# Patient Record
Sex: FEMALE | Race: WHITE | Employment: UNEMPLOYED | URBAN - METROPOLITAN AREA
[De-identification: names, ages, dates, MRNs, and addresses within clinical notes are randomized per-mention and may not be internally consistent; named-entity substitution may affect disease eponyms.]

---
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
## 2022-03-23 NOTE — ED NOTES
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 and the employee does a different thing  He states he then gets upset and ruminates about this after work as well  States that he has not been doing anything" outside of work  States that he knows he should exercise or get out and do things but he has not been doing that  He has been getting out to breakfast once a week with his mother and family which he looks forward to that  Taking his medications  States that he did not split up gabapentin to 300 mg morning and 600 mg night has been taking 300 mg 3 times a day  We discussed that this may be potentially be causing some decreased motivation the evening  States he takes 300 mg when he gets home from work  He will try the 600 at night and 300 in the morning  He has been consistent with taking the BuSpar  Physically he is feeling well and no new medications are medical issues noted  He denies suicidal ideation, intent or plan at present, has no suicidal ideation, intent or plan at present  He denies any auditory hallucinations and visual hallucinations, denies any other delusional thinking, denies any delusional thinking  He denies any side effects from medications      HPI ROS Appetite Changes and Sleep: normal appetite, normal sleep    Review Of Systems:      Constitutional Negative   ENT Negative   Cardiovascular Negative   Respiratory Negative   Gastrointestinal Negative   Genitourinary Negative   Musculoskeletal Negative   Integumentary Negative Patient resting in bed in no apparent distress. Call bell in reach, bed in low locked position. Father at bedside. Neurological Negative   Endocrine Negative   Other Symptoms Negative and None       Laboratory Results: No results found for this or any previous visit  Substance Abuse History:    Social History     Substance and Sexual Activity   Drug Use Not Currently    Types: Marijuana    Comment: previously used medical marijuana but not currently using       Family Psychiatric History:     No family history on file      The following portions of the patient's history were reviewed and updated as appropriate: past family history, past medical history, past social history, past surgical history and problem list     Social History     Socioeconomic History    Marital status: Single     Spouse name: Not on file    Number of children: Not on file    Years of education: Not on file    Highest education level: Not on file   Occupational History    Not on file   Tobacco Use    Smoking status: Former Smoker     Types: Cigarettes     Quit date:      Years since quittin 2    Smokeless tobacco: Never Used   Vaping Use    Vaping Use: Never used   Substance and Sexual Activity    Alcohol use: Not Currently     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    Drug use: Not Currently     Types: Marijuana     Comment: previously used medical marijuana but not currently using    Sexual activity: Not on file   Other Topics Concern    Not on file   Social History Narrative    Not on file     Social Determinants of Health     Financial Resource Strain: Not on file   Food Insecurity: Not on file   Transportation Needs: Not on file   Physical Activity: Not on file   Stress: Not on file   Social Connections: Not on file   Intimate Partner Violence: Not on file   Housing Stability: Not on file     Social History     Social History Narrative    Not on file        Social History     Tobacco History     Smoking Status  Former Smoker Quit date  2004 Smoking Tobacco Type  Cigarettes    Smokeless Tobacco Use  Never Used          Alcohol History Alcohol Use Status  Not Currently Comment  7 years sober          Drug Use     Drug Use Status  Not Currently Types  Marijuana Comment  previously used medical marijuana but not currently using          Sexual Activity     Sexually Active  Not Asked          Activities of Daily Living    Not Asked               Additional Substance Use Detail     Questions Responses    Problems Due to Past Use of Alcohol? Yes    Problems Due to Past Use of Substances? Yes    Substance Use Assessment Denies substance use within the past 12 months    Alcohol Use Frequency Denies use in past 12 months    Cannabis frequency Past regular use    Comment: Past rare use on 6/9/2020 Past rare use ->Past regular use on 7/24/2020     Heroin Frequency Denies use in past 12 months    Cannabis method Smoke    Comment: Smoke on 7/24/2020     1st Use of Alcohol age 16    Cannabis 1st Use medical marijuana for anxiety    Last Use of Alcohol & Amount sober 6 5 years    Longest Abstinence from Alcohol 6 5 years    Cocaine frequency Past regular use    Comment: Never used on 6/9/2020 Never used ->Past regular use on 7/24/2020     Crack Cocaine Frequency Prior dependence    Methamphetamine Frequency Denies use in past 12 months    Narcotic Frequency Denies use in past 12 months    Benzodiazepine Frequency Denies use in past 12 months    Amphetamine frequency Denies use in past 12 months    Barbituate Frequency Denies use use in past 12 months    Inhalant frequency Never used    Comment: Never used on 6/9/2020     Hallucinogen frequency Past regular use    Comment: Never used on 6/9/2020 Never used ->Past regular use on 7/24/2020     Ecstasy frequency Never used    Comment: Never used on 6/9/2020     Other drug frequency Never used    Comment: Never used on 6/9/2020     Opiate frequency Denies use in past 12 months    Not reviewed              OBJECTIVE:     Mental Status Evaluation:    Appearance age appropriate, casually dressed, good eye contact Behavior Polite, cooperative   Speech normal volume, normal pitch   Mood Dysthymic   Affect Congruent   Thought Processes logical   Associations intact associations   Thought Content normal   Perceptual Disturbances: none   Abnormal Thoughts  Risk Potential Suicidal ideation - None  Homicidal ideation - None  Potential for aggression - No   Orientation oriented to person, place, time/date and situation   Memory recent and remote memory grossly intact   Cosciousness alert and awake   Attention Span attention span and concentration are age appropriate   Intellect Not formally assessed   Insight age appropriate    Judgement good    Muscle Strength and  Gait Steady gait   Language Within normal limits   Fund of Knowledge displays adequate knowledge of current events   Pain none   Pain Scale 0       Assessment/Plan:       Diagnoses and all orders for this visit:    Major depressive disorder, recurrent, severe without psychotic features ([facility] [address] )  -     Ambulatory referral to Psychology; Future  -     lithium 600 MG capsule; Take 1 capsule (600 mg total) by mouth daily at bedtime  -     DULoxetine (CYMBALTA) 20 mg capsule; 2 po qd    Encounter for lithium monitoring  -     Ambulatory referral to Psychology; Future    Generalized anxiety disorder  -     Ambulatory referral to Psychology; Future  -     lithium 600 MG capsule; Take 1 capsule (600 mg total) by mouth daily at bedtime  -     gabapentin (NEURONTIN) 300 mg capsule; 1 po qam and 2 po qhs  -     busPIRone (BUSPAR) 15 mg tablet;  Take 1 tablet (15 mg total) by mouth 3 (three) times a day    Psychophysiological insomnia  -     traZODone (DESYREL) 50 mg tablet; 1/2 - 1 po qhs prn          Treatment Recommendations/Precautions:  Referral placed to psychology services for therapy  Also discussed physical activity and he will ride his bike once a week    Lithium 600 mg at bedtime  Last lithium level was 0 5 on November 2021  Cymbalta 40 mg daily  BuSpar 15 mg t i d  Gabapentin 300 mg the morning 600 mg at night  Trazodone 50 mg half to one at bedtime p r n  Follow up 2 months      Continue current medications    Risks/Benefits      Risks, Benefits And Possible Side Effects Of Medications:    Risks, benefits, and possible side effects of medications explained to patient and patient verbalizes understanding and agreement for treatment      Controlled Medication Discussion:     Not applicable    Psychotherapy Provided:     Individual psychotherapy provided: No

## 2022-11-15 ENCOUNTER — HOSPITAL ENCOUNTER (EMERGENCY)
Age: 22
Discharge: HOME | End: 2022-11-15
Attending: EMERGENCY MEDICINE
Payer: MEDICAID

## 2022-11-15 VITALS
BODY MASS INDEX: 21.37 KG/M2 | TEMPERATURE: 98 F | OXYGEN SATURATION: 100 % | SYSTOLIC BLOOD PRESSURE: 123 MMHG | HEIGHT: 59 IN | HEART RATE: 92 BPM | RESPIRATION RATE: 16 BRPM | DIASTOLIC BLOOD PRESSURE: 72 MMHG | WEIGHT: 106 LBS

## 2022-11-15 DIAGNOSIS — O21.9 NAUSEA AND VOMITING DURING PREGNANCY: Primary | ICD-10-CM

## 2022-11-15 LAB
ALBUMIN SERPL-MCNC: 4.6 G/DL (ref 3.5–5)
ALBUMIN/GLOB SERPL: 1.3 {RATIO} (ref 1.1–2.2)
ALP SERPL-CCNC: 45 U/L (ref 45–117)
ALT SERPL-CCNC: 46 U/L (ref 12–78)
ANION GAP SERPL CALC-SCNC: 12 MMOL/L (ref 5–15)
APPEARANCE UR: CLEAR
AST SERPL-CCNC: 33 U/L (ref 15–37)
BACTERIA URNS QL MICRO: ABNORMAL /HPF
BASOPHILS # BLD: 0 K/UL (ref 0–0.1)
BASOPHILS NFR BLD: 1 % (ref 0–1)
BILIRUB SERPL-MCNC: 0.6 MG/DL (ref 0.2–1)
BILIRUB UR QL: NEGATIVE
BUN SERPL-MCNC: 5 MG/DL (ref 6–20)
BUN/CREAT SERPL: 11 (ref 12–20)
CALCIUM SERPL-MCNC: 8.8 MG/DL (ref 8.5–10.1)
CHLORIDE SERPL-SCNC: 104 MMOL/L (ref 97–108)
CO2 SERPL-SCNC: 18 MMOL/L (ref 21–32)
COLOR UR: ABNORMAL
COMMENT, HOLDF: NORMAL
CREAT SERPL-MCNC: 0.46 MG/DL (ref 0.55–1.02)
DIFFERENTIAL METHOD BLD: NORMAL
EOSINOPHIL # BLD: 0 K/UL (ref 0–0.4)
EOSINOPHIL NFR BLD: 0 % (ref 0–7)
EPITH CASTS URNS QL MICRO: ABNORMAL /LPF
ERYTHROCYTE [DISTWIDTH] IN BLOOD BY AUTOMATED COUNT: 12.5 % (ref 11.5–14.5)
FLUAV AG NPH QL IA: NEGATIVE
FLUBV AG NOSE QL IA: NEGATIVE
GLOBULIN SER CALC-MCNC: 3.5 G/DL (ref 2–4)
GLUCOSE SERPL-MCNC: 77 MG/DL (ref 65–100)
GLUCOSE UR STRIP.AUTO-MCNC: NEGATIVE MG/DL
HCT VFR BLD AUTO: 44.8 % (ref 35–47)
HGB BLD-MCNC: 15.7 G/DL (ref 11.5–16)
HGB UR QL STRIP: NEGATIVE
HYALINE CASTS URNS QL MICRO: ABNORMAL /LPF (ref 0–2)
IMM GRANULOCYTES # BLD AUTO: 0 K/UL (ref 0–0.04)
IMM GRANULOCYTES NFR BLD AUTO: 0 % (ref 0–0.5)
KETONES UR QL STRIP.AUTO: >80 MG/DL
LEUKOCYTE ESTERASE UR QL STRIP.AUTO: NEGATIVE
LYMPHOCYTES # BLD: 2 K/UL (ref 0.8–3.5)
LYMPHOCYTES NFR BLD: 39 % (ref 12–49)
MAGNESIUM SERPL-MCNC: 2.1 MG/DL (ref 1.6–2.4)
MCH RBC QN AUTO: 31.1 PG (ref 26–34)
MCHC RBC AUTO-ENTMCNC: 35 G/DL (ref 30–36.5)
MCV RBC AUTO: 88.7 FL (ref 80–99)
MONOCYTES # BLD: 0.5 K/UL (ref 0–1)
MONOCYTES NFR BLD: 10 % (ref 5–13)
NEUTS SEG # BLD: 2.6 K/UL (ref 1.8–8)
NEUTS SEG NFR BLD: 50 % (ref 32–75)
NITRITE UR QL STRIP.AUTO: NEGATIVE
NRBC # BLD: 0 K/UL (ref 0–0.01)
NRBC BLD-RTO: 0 PER 100 WBC
PH UR STRIP: 5.5 [PH] (ref 5–8)
PLATELET # BLD AUTO: 227 K/UL (ref 150–400)
PMV BLD AUTO: 9.2 FL (ref 8.9–12.9)
POTASSIUM SERPL-SCNC: 3.5 MMOL/L (ref 3.5–5.1)
PROT SERPL-MCNC: 8.1 G/DL (ref 6.4–8.2)
PROT UR STRIP-MCNC: 30 MG/DL
RBC # BLD AUTO: 5.05 M/UL (ref 3.8–5.2)
RBC #/AREA URNS HPF: ABNORMAL /HPF (ref 0–5)
SAMPLES BEING HELD,HOLD: NORMAL
SODIUM SERPL-SCNC: 134 MMOL/L (ref 136–145)
SP GR UR REFRACTOMETRY: 1.03 (ref 1–1.03)
UROBILINOGEN UR QL STRIP.AUTO: 1 EU/DL (ref 0.2–1)
WBC # BLD AUTO: 5.2 K/UL (ref 3.6–11)
WBC URNS QL MICRO: ABNORMAL /HPF (ref 0–4)

## 2022-11-15 PROCEDURE — 36415 COLL VENOUS BLD VENIPUNCTURE: CPT

## 2022-11-15 PROCEDURE — 85025 COMPLETE CBC W/AUTO DIFF WBC: CPT

## 2022-11-15 PROCEDURE — 99284 EMERGENCY DEPT VISIT MOD MDM: CPT

## 2022-11-15 PROCEDURE — U0005 INFEC AGEN DETEC AMPLI PROBE: HCPCS

## 2022-11-15 PROCEDURE — 80053 COMPREHEN METABOLIC PANEL: CPT

## 2022-11-15 PROCEDURE — 74011250636 HC RX REV CODE- 250/636: Performed by: EMERGENCY MEDICINE

## 2022-11-15 PROCEDURE — 83735 ASSAY OF MAGNESIUM: CPT

## 2022-11-15 PROCEDURE — 87804 INFLUENZA ASSAY W/OPTIC: CPT

## 2022-11-15 PROCEDURE — 96374 THER/PROPH/DIAG INJ IV PUSH: CPT

## 2022-11-15 PROCEDURE — 96361 HYDRATE IV INFUSION ADD-ON: CPT

## 2022-11-15 PROCEDURE — 81001 URINALYSIS AUTO W/SCOPE: CPT

## 2022-11-15 RX ORDER — ONDANSETRON 2 MG/ML
4 INJECTION INTRAMUSCULAR; INTRAVENOUS
Status: COMPLETED | OUTPATIENT
Start: 2022-11-15 | End: 2022-11-15

## 2022-11-15 RX ADMIN — ONDANSETRON 4 MG: 2 INJECTION INTRAMUSCULAR; INTRAVENOUS at 16:03

## 2022-11-15 RX ADMIN — SODIUM CHLORIDE 1000 ML: 9 INJECTION, SOLUTION INTRAVENOUS at 15:58

## 2022-11-15 NOTE — ED PROVIDER NOTES
Benita Rivera is a 24 yo F with is 7 weeks pregnant  and has had nausea and vomiting for the past 3 days. She also had a fever of 100.9 yesterday. She denies cough or congestion, diarrhea, vaginal discharge or bleeding or pain. She has not yet made an appointment with an OB. She has been taking zofran and Unasom and B6 but it has not helped much. No past medical history on file. No past surgical history on file. Family History:   Problem Relation Age of Onset    No Known Problems Mother     No Known Problems Father        Social History     Socioeconomic History    Marital status: SINGLE     Spouse name: Not on file    Number of children: Not on file    Years of education: Not on file    Highest education level: Not on file   Occupational History    Not on file   Tobacco Use    Smoking status: Never    Smokeless tobacco: Not on file   Substance and Sexual Activity    Alcohol use: No    Drug use: No    Sexual activity: Never   Other Topics Concern    Not on file   Social History Narrative    Not on file     Social Determinants of Health     Financial Resource Strain: Not on file   Food Insecurity: Not on file   Transportation Needs: Not on file   Physical Activity: Not on file   Stress: Not on file   Social Connections: Not on file   Intimate Partner Violence: Not on file   Housing Stability: Not on file         ALLERGIES: Motrin [ibuprofen]    Review of Systems   Constitutional:  Positive for fever. HENT:  Negative for sore throat. Eyes:  Negative for visual disturbance. Respiratory:  Negative for cough. Cardiovascular:  Negative for chest pain. Gastrointestinal:  Positive for nausea and vomiting. Negative for abdominal pain. Genitourinary:  Negative for dysuria, vaginal bleeding, vaginal discharge and vaginal pain. Musculoskeletal:  Negative for back pain. Skin:  Negative for rash. Neurological:  Negative for headaches.      Vitals:    11/15/22 1548   BP: (!) 144/79 Pulse: (!) 116   Resp: 16   Temp: 97.9 °F (36.6 °C)   SpO2: 99%   Weight: 48.1 kg (106 lb)   Height: 4' 11\" (1.499 m)            Physical Exam  Vitals and nursing note reviewed. Constitutional:       General: She is not in acute distress. Appearance: She is well-developed. HENT:      Head: Normocephalic and atraumatic. Mouth/Throat:      Mouth: Mucous membranes are moist.   Eyes:      Extraocular Movements: Extraocular movements intact. Conjunctiva/sclera: Conjunctivae normal.   Neck:      Trachea: Phonation normal.   Cardiovascular:      Rate and Rhythm: Normal rate. Pulmonary:      Effort: Pulmonary effort is normal. No respiratory distress. Abdominal:      General: There is no distension. Musculoskeletal:         General: No tenderness. Normal range of motion. Cervical back: Normal range of motion. Skin:     General: Skin is warm and dry. Neurological:      General: No focal deficit present. Mental Status: She is alert. She is not disoriented. Motor: No abnormal muscle tone. MDM         6:41 PM  Patient tolerating PO gingerale and crackers and HR improved after zofran and IVNS bolus. Will discharge home.       Procedures

## 2022-11-15 NOTE — ED TRIAGE NOTES
7 weeks pregnant with continuous nausea and vomiting. . Seen at [facility] ED 2 days ago for same, given zofran PO after dc with no relief.      Denies vaginal bleeding or abdominal cramping

## 2022-11-16 LAB
SARS-COV-2, XPLCVT: DETECTED
SOURCE, COVRS: ABNORMAL

## 2022-11-17 ENCOUNTER — HOSPITAL ENCOUNTER (EMERGENCY)
Age: 22
Discharge: HOME | End: 2022-11-17
Attending: EMERGENCY MEDICINE
Payer: MEDICAID

## 2022-11-17 VITALS
DIASTOLIC BLOOD PRESSURE: 73 MMHG | WEIGHT: 101.41 LBS | OXYGEN SATURATION: 97 % | TEMPERATURE: 98.8 F | RESPIRATION RATE: 18 BRPM | HEIGHT: 59 IN | SYSTOLIC BLOOD PRESSURE: 124 MMHG | BODY MASS INDEX: 20.44 KG/M2 | HEART RATE: 97 BPM

## 2022-11-17 DIAGNOSIS — O21.0 HYPEREMESIS GRAVIDARUM: Primary | ICD-10-CM

## 2022-11-17 LAB
ALBUMIN SERPL-MCNC: 5.5 G/DL (ref 3.5–5)
ALBUMIN/GLOB SERPL: 1.4 {RATIO} (ref 1.1–2.2)
ALP SERPL-CCNC: 53 U/L (ref 45–117)
ALT SERPL-CCNC: 44 U/L (ref 12–78)
ANION GAP SERPL CALC-SCNC: 20 MMOL/L (ref 5–15)
APPEARANCE UR: CLEAR
AST SERPL-CCNC: 32 U/L (ref 15–37)
BACTERIA URNS QL MICRO: ABNORMAL /HPF
BASOPHILS # BLD: 0 K/UL (ref 0–0.1)
BASOPHILS NFR BLD: 0 % (ref 0–1)
BILIRUB SERPL-MCNC: 1.2 MG/DL (ref 0.2–1)
BILIRUB UR QL: NEGATIVE
BUN SERPL-MCNC: 4 MG/DL (ref 6–20)
BUN/CREAT SERPL: 6 (ref 12–20)
CALCIUM SERPL-MCNC: 9 MG/DL (ref 8.5–10.1)
CHLORIDE SERPL-SCNC: 97 MMOL/L (ref 97–108)
CO2 SERPL-SCNC: 17 MMOL/L (ref 21–32)
COLOR UR: ABNORMAL
CREAT SERPL-MCNC: 0.63 MG/DL (ref 0.55–1.02)
DIFFERENTIAL METHOD BLD: ABNORMAL
EOSINOPHIL # BLD: 0 K/UL (ref 0–0.4)
EOSINOPHIL NFR BLD: 0 % (ref 0–7)
EPITH CASTS URNS QL MICRO: ABNORMAL /LPF
ERYTHROCYTE [DISTWIDTH] IN BLOOD BY AUTOMATED COUNT: 12.1 % (ref 11.5–14.5)
GLOBULIN SER CALC-MCNC: 3.8 G/DL (ref 2–4)
GLUCOSE SERPL-MCNC: 75 MG/DL (ref 65–100)
GLUCOSE UR STRIP.AUTO-MCNC: NEGATIVE MG/DL
HCG SERPL-ACNC: ABNORMAL MIU/ML (ref 0–6)
HCT VFR BLD AUTO: 47.3 % (ref 35–47)
HGB BLD-MCNC: 16.7 G/DL (ref 11.5–16)
HGB UR QL STRIP: ABNORMAL
IMM GRANULOCYTES # BLD AUTO: 0 K/UL (ref 0–0.04)
IMM GRANULOCYTES NFR BLD AUTO: 0 % (ref 0–0.5)
KETONES UR QL STRIP.AUTO: >80 MG/DL
LEUKOCYTE ESTERASE UR QL STRIP.AUTO: NEGATIVE
LIPASE SERPL-CCNC: 86 U/L (ref 73–393)
LYMPHOCYTES # BLD: 2.3 K/UL (ref 0.8–3.5)
LYMPHOCYTES NFR BLD: 27 % (ref 12–49)
MCH RBC QN AUTO: 30.8 PG (ref 26–34)
MCHC RBC AUTO-ENTMCNC: 35.3 G/DL (ref 30–36.5)
MCV RBC AUTO: 87.3 FL (ref 80–99)
MONOCYTES # BLD: 0.6 K/UL (ref 0–1)
MONOCYTES NFR BLD: 7 % (ref 5–13)
NEUTS SEG # BLD: 5.5 K/UL (ref 1.8–8)
NEUTS SEG NFR BLD: 65 % (ref 32–75)
NITRITE UR QL STRIP.AUTO: NEGATIVE
NRBC # BLD: 0 K/UL (ref 0–0.01)
NRBC BLD-RTO: 0 PER 100 WBC
PH UR STRIP: 6 [PH] (ref 5–8)
PLATELET # BLD AUTO: 242 K/UL (ref 150–400)
PMV BLD AUTO: 9.5 FL (ref 8.9–12.9)
POTASSIUM SERPL-SCNC: 3.4 MMOL/L (ref 3.5–5.1)
PROT SERPL-MCNC: 9.3 G/DL (ref 6.4–8.2)
PROT UR STRIP-MCNC: ABNORMAL MG/DL
RBC # BLD AUTO: 5.42 M/UL (ref 3.8–5.2)
RBC #/AREA URNS HPF: ABNORMAL /HPF (ref 0–5)
SODIUM SERPL-SCNC: 134 MMOL/L (ref 136–145)
SP GR UR REFRACTOMETRY: 1.02 (ref 1–1.03)
UROBILINOGEN UR QL STRIP.AUTO: 0.2 EU/DL (ref 0.2–1)
WBC # BLD AUTO: 8.4 K/UL (ref 3.6–11)
WBC URNS QL MICRO: ABNORMAL /HPF (ref 0–4)

## 2022-11-17 PROCEDURE — 83690 ASSAY OF LIPASE: CPT

## 2022-11-17 PROCEDURE — 81001 URINALYSIS AUTO W/SCOPE: CPT

## 2022-11-17 PROCEDURE — 36415 COLL VENOUS BLD VENIPUNCTURE: CPT

## 2022-11-17 PROCEDURE — 74011250636 HC RX REV CODE- 250/636: Performed by: EMERGENCY MEDICINE

## 2022-11-17 PROCEDURE — 85025 COMPLETE CBC W/AUTO DIFF WBC: CPT

## 2022-11-17 PROCEDURE — 80053 COMPREHEN METABOLIC PANEL: CPT

## 2022-11-17 PROCEDURE — 84702 CHORIONIC GONADOTROPIN TEST: CPT

## 2022-11-17 RX ORDER — DIMENHYDRINATE 50 MG
50 TABLET ORAL
Status: DISCONTINUED | OUTPATIENT
Start: 2022-11-17 | End: 2022-11-17

## 2022-11-17 RX ORDER — PYRIDOXINE HYDROCHLORIDE 100 MG/ML
10 INJECTION, SOLUTION INTRAMUSCULAR; INTRAVENOUS
Status: DISCONTINUED | OUTPATIENT
Start: 2022-11-17 | End: 2022-11-17

## 2022-11-17 RX ORDER — METOCLOPRAMIDE HYDROCHLORIDE 5 MG/ML
10 INJECTION INTRAMUSCULAR; INTRAVENOUS EVERY 6 HOURS
Status: DISCONTINUED | OUTPATIENT
Start: 2022-11-17 | End: 2022-11-18 | Stop reason: HOSPADM

## 2022-11-17 RX ORDER — DIPHENHYDRAMINE HYDROCHLORIDE 50 MG/ML
25 INJECTION, SOLUTION INTRAMUSCULAR; INTRAVENOUS
Status: COMPLETED | OUTPATIENT
Start: 2022-11-17 | End: 2022-11-17

## 2022-11-17 RX ORDER — ONDANSETRON 2 MG/ML
4 INJECTION INTRAMUSCULAR; INTRAVENOUS ONCE
Status: DISCONTINUED | OUTPATIENT
Start: 2022-11-17 | End: 2022-11-17

## 2022-11-17 RX ORDER — METOCLOPRAMIDE 5 MG/1
5 TABLET ORAL
Qty: 20 TABLET | Refills: 0 | Status: SHIPPED | OUTPATIENT
Start: 2022-11-17 | End: 2022-11-27

## 2022-11-17 RX ADMIN — SODIUM CHLORIDE 1000 ML: 9 INJECTION, SOLUTION INTRAVENOUS at 20:13

## 2022-11-17 RX ADMIN — METOCLOPRAMIDE 10 MG: 5 INJECTION, SOLUTION INTRAMUSCULAR; INTRAVENOUS at 20:12

## 2022-11-17 RX ADMIN — DIPHENHYDRAMINE HYDROCHLORIDE 25 MG: 50 INJECTION, SOLUTION INTRAMUSCULAR; INTRAVENOUS at 20:18

## 2022-11-17 NOTE — PROGRESS NOTES
Pt correct number [phone]  I attempted to reach the patient and/or the patient's parent to discuss available lab result.  Message was left for call back concerning lab result

## 2022-11-18 NOTE — ED NOTES
Pt discharged home with grandfather, verbalized understanding of discharge instructions and prescription that was e-scribed, pt ambulated out of dept with steady gait.

## 2022-11-18 NOTE — DISCHARGE INSTRUCTIONS
You were seen in the emergency department for vomiting. The results of your tests including an EKG, imaging, and lab work, were all consistent with dehydration and decreased food intake. Although an exact cause of your symptoms was not identified, the most likely cause is hyperemesis gravidarum. Please take any medications prescribed at this visit as instructed. Please also follow-up with your PCP or return to the emergency department if you experience a worsening of symptoms or any new symptoms that are concerning to you.

## 2022-11-18 NOTE — ED PROVIDER NOTES
25year-old G2, P0, 8 weeks pregnant by dates presents to the emergency department on her third visit in 1 week complaining of nausea, vomiting, diarrhea and mild intermittent diffuse abdominal pain that she relates to diarrhea. She reports that she cannot keep water down if she does not try to eat solids along with it, but otherwise has vomited up any food that she is eaten for the last few days. She notes that she has been taking Zofran at home with no relief of symptoms. She also reports that they have given her Zofran in the emergency department, with no relief. She notes that the earliest she was able to get into see her OB/GYN was November 28. She has no additional complaints at this time    The history is provided by the patient. Pregnancy Problem   Associated symptoms include diarrhea, nausea and vomiting. History reviewed. No pertinent past medical history. History reviewed. No pertinent surgical history. Family History:   Problem Relation Age of Onset    No Known Problems Mother     No Known Problems Father        Social History     Socioeconomic History    Marital status: SINGLE     Spouse name: Not on file    Number of children: Not on file    Years of education: Not on file    Highest education level: Not on file   Occupational History    Not on file   Tobacco Use    Smoking status: Never    Smokeless tobacco: Not on file   Substance and Sexual Activity    Alcohol use: No    Drug use: No    Sexual activity: Never   Other Topics Concern    Not on file   Social History Narrative    Not on file     Social Determinants of Health     Financial Resource Strain: Not on file   Food Insecurity: Not on file   Transportation Needs: Not on file   Physical Activity: Not on file   Stress: Not on file   Social Connections: Not on file   Intimate Partner Violence: Not on file   Housing Stability: Not on file         ALLERGIES: Motrin [ibuprofen]    Review of Systems   Constitutional: Negative. HENT: Negative. Eyes: Negative. Respiratory: Negative. Cardiovascular: Negative. Gastrointestinal:  Positive for diarrhea, nausea and vomiting. Endocrine: Negative. Genitourinary: Negative. Musculoskeletal: Negative. Skin: Negative. Neurological: Negative. Hematological: Negative. Psychiatric/Behavioral: Negative. Vitals:    11/17/22 1934   BP: (!) 146/81   Pulse: 97   Resp: 18   Temp: 98.8 °F (37.1 °C)   SpO2: 97%   Weight: 46 kg (101 lb 6.6 oz)   Height: 4' 11\" (1.499 m)            Physical Exam  Vitals and nursing note reviewed. Constitutional:       General: She is not in acute distress. Appearance: Normal appearance. She is not ill-appearing. HENT:      Head: Normocephalic and atraumatic. Nose: Nose normal.      Mouth/Throat:      Mouth: Mucous membranes are moist.   Eyes:      Extraocular Movements: Extraocular movements intact. Pupils: Pupils are equal, round, and reactive to light. Cardiovascular:      Rate and Rhythm: Regular rhythm. Tachycardia present. Pulses: Normal pulses. Pulmonary:      Effort: Pulmonary effort is normal. No respiratory distress. Breath sounds: Normal breath sounds. Abdominal:      General: There is no distension. Palpations: Abdomen is soft. Tenderness: There is no abdominal tenderness. Musculoskeletal:         General: Normal range of motion. Cervical back: Normal range of motion and neck supple. Skin:     General: Skin is warm and dry. Neurological:      General: No focal deficit present. Mental Status: She is alert and oriented to person, place, and time.    Psychiatric:         Mood and Affect: Mood normal.        MDM  Number of Diagnoses or Management Options  Hyperemesis gravidarum  Diagnosis management comments: DDx: Hyperemesis gravidarum, viral gastroenteritis, COVID    Plan:  - Labs: CBC, CMP, UA, beta HCG  - Medications: Metoclopramide, diphenhydramine  - Consults: OB/GYN for expedited follow-up    Reassessment: Patient has some labs that are concerning for dehydration and starvation, including ketones in her urine. Additionally she has lost 2 KGs in the last 2 days. Reassuringly though, with the above interventions, she has been able to tolerate soft food without vomiting or feeling nauseated. Spoke to Dr. Gomez Cooney, the covering physician for patient's OB/GYN Dr. Fiorella Romo, and she agrees with plan to discharge patient with prescription for the above and states that if patient calls their office tomorrow morning, she will be able to be seen. Discussed this with the patient and she agrees with this plan. We will discharge her at this time with strict return precautions for worsening vomiting or abdominal pain.        Amount and/or Complexity of Data Reviewed  Clinical lab tests: reviewed    Risk of Complications, Morbidity, and/or Mortality  Presenting problems: low  Diagnostic procedures: low  Management options: low    Patient Progress  Patient progress: improved         Procedures

## 2022-11-18 NOTE — ED TRIAGE NOTES
Pt is eight weeks pregnant. Pt has received fluids 3 times this week for hyperemesis. Pt was not able to get an appt with OB Nov 28.

## 2022-11-19 ENCOUNTER — HOSPITAL ENCOUNTER (OUTPATIENT)
Age: 22
Setting detail: OBSERVATION
Discharge: LEFT BEFORE DISCHARGE | End: 2022-11-19
Attending: STUDENT IN AN ORGANIZED HEALTH CARE EDUCATION/TRAINING PROGRAM | Admitting: HOSPITALIST
Payer: MEDICAID

## 2022-11-19 ENCOUNTER — APPOINTMENT (OUTPATIENT)
Dept: GENERAL RADIOLOGY | Age: 22
End: 2022-11-19
Attending: STUDENT IN AN ORGANIZED HEALTH CARE EDUCATION/TRAINING PROGRAM
Payer: MEDICAID

## 2022-11-19 VITALS
BODY MASS INDEX: 19.24 KG/M2 | HEART RATE: 105 BPM | WEIGHT: 95.46 LBS | DIASTOLIC BLOOD PRESSURE: 66 MMHG | HEIGHT: 59 IN | SYSTOLIC BLOOD PRESSURE: 115 MMHG | OXYGEN SATURATION: 100 % | RESPIRATION RATE: 16 BRPM | TEMPERATURE: 98 F

## 2022-11-19 DIAGNOSIS — O21.0 HYPEREMESIS GRAVIDARUM: Primary | ICD-10-CM

## 2022-11-19 DIAGNOSIS — A41.9 SEPSIS WITHOUT ACUTE ORGAN DYSFUNCTION, DUE TO UNSPECIFIED ORGANISM (HCC): ICD-10-CM

## 2022-11-19 PROBLEM — R11.2 NAUSEA AND VOMITING: Status: ACTIVE | Noted: 2022-11-19

## 2022-11-19 PROBLEM — R65.10 SIRS (SYSTEMIC INFLAMMATORY RESPONSE SYNDROME) (HCC): Status: ACTIVE | Noted: 2022-11-19

## 2022-11-19 PROBLEM — E87.20 METABOLIC ACIDOSIS, NORMAL ANION GAP (NAG): Status: ACTIVE | Noted: 2022-11-19

## 2022-11-19 PROBLEM — R11.10 VOMITING: Status: ACTIVE | Noted: 2022-11-19

## 2022-11-19 PROBLEM — O21.1 HYPEREMESIS GRAVIDARUM WITH METABOLIC DISTURBANCE: Status: ACTIVE | Noted: 2022-11-19

## 2022-11-19 PROBLEM — Z3A.08 8 WEEKS GESTATION OF PREGNANCY: Status: ACTIVE | Noted: 2022-11-19

## 2022-11-19 PROBLEM — U07.1 COVID-19 VIRUS INFECTION: Status: ACTIVE | Noted: 2022-11-19

## 2022-11-19 LAB
ALBUMIN SERPL-MCNC: 5.5 G/DL (ref 3.5–5)
ALBUMIN/GLOB SERPL: 1.4 {RATIO} (ref 1.1–2.2)
ALP SERPL-CCNC: 52 U/L (ref 45–117)
ALT SERPL-CCNC: 38 U/L (ref 12–78)
AMORPH CRY URNS QL MICRO: ABNORMAL
AMPHET UR QL SCN: NEGATIVE
ANION GAP SERPL CALC-SCNC: 12 MMOL/L (ref 5–15)
ANION GAP SERPL CALC-SCNC: 22 MMOL/L (ref 5–15)
APPEARANCE UR: ABNORMAL
AST SERPL-CCNC: 22 U/L (ref 15–37)
BACTERIA URNS QL MICRO: NEGATIVE /HPF
BARBITURATES UR QL SCN: NEGATIVE
BASOPHILS # BLD: 0 K/UL (ref 0–0.1)
BASOPHILS NFR BLD: 0 % (ref 0–1)
BENZODIAZ UR QL: NEGATIVE
BILIRUB SERPL-MCNC: 1.1 MG/DL (ref 0.2–1)
BILIRUB UR QL: NEGATIVE
BUN SERPL-MCNC: 3 MG/DL (ref 6–20)
BUN SERPL-MCNC: 6 MG/DL (ref 6–20)
BUN/CREAT SERPL: 7 (ref 12–20)
BUN/CREAT SERPL: 9 (ref 12–20)
CALCIUM SERPL-MCNC: 7.9 MG/DL (ref 8.5–10.1)
CALCIUM SERPL-MCNC: 9.6 MG/DL (ref 8.5–10.1)
CANNABINOIDS UR QL SCN: POSITIVE
CHLORIDE SERPL-SCNC: 100 MMOL/L (ref 97–108)
CHLORIDE SERPL-SCNC: 116 MMOL/L (ref 97–108)
CO2 SERPL-SCNC: 13 MMOL/L (ref 21–32)
CO2 SERPL-SCNC: 15 MMOL/L (ref 21–32)
COCAINE UR QL SCN: NEGATIVE
COLOR UR: ABNORMAL
COMMENT, HOLDF: NORMAL
CREAT SERPL-MCNC: 0.44 MG/DL (ref 0.55–1.02)
CREAT SERPL-MCNC: 0.7 MG/DL (ref 0.55–1.02)
DIFFERENTIAL METHOD BLD: ABNORMAL
DRUG SCRN COMMENT,DRGCM: ABNORMAL
EOSINOPHIL # BLD: 0 K/UL (ref 0–0.4)
EOSINOPHIL NFR BLD: 0 % (ref 0–7)
EPITH CASTS URNS QL MICRO: ABNORMAL /LPF
ERYTHROCYTE [DISTWIDTH] IN BLOOD BY AUTOMATED COUNT: 12 % (ref 11.5–14.5)
GLOBULIN SER CALC-MCNC: 3.8 G/DL (ref 2–4)
GLUCOSE SERPL-MCNC: 127 MG/DL (ref 65–100)
GLUCOSE SERPL-MCNC: 92 MG/DL (ref 65–100)
GLUCOSE UR STRIP.AUTO-MCNC: NEGATIVE MG/DL
HCT VFR BLD AUTO: 43.6 % (ref 35–47)
HGB BLD-MCNC: 16.2 G/DL (ref 11.5–16)
HGB UR QL STRIP: ABNORMAL
IMM GRANULOCYTES # BLD AUTO: 0.1 K/UL (ref 0–0.04)
IMM GRANULOCYTES NFR BLD AUTO: 0 % (ref 0–0.5)
KETONES UR QL STRIP.AUTO: >80 MG/DL
LACTATE SERPL-SCNC: 1.4 MMOL/L (ref 0.4–2)
LACTATE SERPL-SCNC: 2.1 MMOL/L (ref 0.4–2)
LEUKOCYTE ESTERASE UR QL STRIP.AUTO: NEGATIVE
LIPASE SERPL-CCNC: 101 U/L (ref 73–393)
LYMPHOCYTES # BLD: 1.1 K/UL (ref 0.8–3.5)
LYMPHOCYTES NFR BLD: 7 % (ref 12–49)
MCH RBC QN AUTO: 31.9 PG (ref 26–34)
MCHC RBC AUTO-ENTMCNC: 37.2 G/DL (ref 30–36.5)
MCV RBC AUTO: 85.8 FL (ref 80–99)
METHADONE UR QL: NEGATIVE
MONOCYTES # BLD: 0.6 K/UL (ref 0–1)
MONOCYTES NFR BLD: 4 % (ref 5–13)
NEUTS SEG # BLD: 13.8 K/UL (ref 1.8–8)
NEUTS SEG NFR BLD: 89 % (ref 32–75)
NITRITE UR QL STRIP.AUTO: NEGATIVE
NRBC # BLD: 0 K/UL (ref 0–0.01)
NRBC BLD-RTO: 0 PER 100 WBC
OPIATES UR QL: POSITIVE
PCP UR QL: NEGATIVE
PH UR STRIP: 6 [PH] (ref 5–8)
PLATELET # BLD AUTO: 276 K/UL (ref 150–400)
PMV BLD AUTO: 10.1 FL (ref 8.9–12.9)
POTASSIUM SERPL-SCNC: 3.3 MMOL/L (ref 3.5–5.1)
POTASSIUM SERPL-SCNC: 3.7 MMOL/L (ref 3.5–5.1)
PROT SERPL-MCNC: 9.3 G/DL (ref 6.4–8.2)
PROT UR STRIP-MCNC: 100 MG/DL
RBC # BLD AUTO: 5.08 M/UL (ref 3.8–5.2)
RBC #/AREA URNS HPF: ABNORMAL /HPF (ref 0–5)
SAMPLES BEING HELD,HOLD: NORMAL
SODIUM SERPL-SCNC: 137 MMOL/L (ref 136–145)
SODIUM SERPL-SCNC: 141 MMOL/L (ref 136–145)
SP GR UR REFRACTOMETRY: 1.02 (ref 1–1.03)
UR CULT HOLD, URHOLD: NORMAL
UROBILINOGEN UR QL STRIP.AUTO: 0.2 EU/DL (ref 0.2–1)
WBC # BLD AUTO: 15.5 K/UL (ref 3.6–11)
WBC URNS QL MICRO: ABNORMAL /HPF (ref 0–4)

## 2022-11-19 PROCEDURE — 36415 COLL VENOUS BLD VENIPUNCTURE: CPT

## 2022-11-19 PROCEDURE — 81001 URINALYSIS AUTO W/SCOPE: CPT

## 2022-11-19 PROCEDURE — 85025 COMPLETE CBC W/AUTO DIFF WBC: CPT

## 2022-11-19 PROCEDURE — 83690 ASSAY OF LIPASE: CPT

## 2022-11-19 PROCEDURE — 74011250636 HC RX REV CODE- 250/636: Performed by: HOSPITALIST

## 2022-11-19 PROCEDURE — 96366 THER/PROPH/DIAG IV INF ADDON: CPT

## 2022-11-19 PROCEDURE — 96365 THER/PROPH/DIAG IV INF INIT: CPT

## 2022-11-19 PROCEDURE — 80053 COMPREHEN METABOLIC PANEL: CPT

## 2022-11-19 PROCEDURE — 71045 X-RAY EXAM CHEST 1 VIEW: CPT

## 2022-11-19 PROCEDURE — 96376 TX/PRO/DX INJ SAME DRUG ADON: CPT

## 2022-11-19 PROCEDURE — G0378 HOSPITAL OBSERVATION PER HR: HCPCS

## 2022-11-19 PROCEDURE — 96361 HYDRATE IV INFUSION ADD-ON: CPT

## 2022-11-19 PROCEDURE — 65270000046 HC RM TELEMETRY

## 2022-11-19 PROCEDURE — 96374 THER/PROPH/DIAG INJ IV PUSH: CPT

## 2022-11-19 PROCEDURE — 74011250636 HC RX REV CODE- 250/636: Performed by: STUDENT IN AN ORGANIZED HEALTH CARE EDUCATION/TRAINING PROGRAM

## 2022-11-19 PROCEDURE — 80307 DRUG TEST PRSMV CHEM ANLYZR: CPT

## 2022-11-19 PROCEDURE — 83605 ASSAY OF LACTIC ACID: CPT

## 2022-11-19 PROCEDURE — 93005 ELECTROCARDIOGRAM TRACING: CPT

## 2022-11-19 PROCEDURE — 74011250636 HC RX REV CODE- 250/636: Performed by: INTERNAL MEDICINE

## 2022-11-19 PROCEDURE — 74011250636 HC RX REV CODE- 250/636

## 2022-11-19 PROCEDURE — 99285 EMERGENCY DEPT VISIT HI MDM: CPT

## 2022-11-19 PROCEDURE — 96375 TX/PRO/DX INJ NEW DRUG ADDON: CPT

## 2022-11-19 PROCEDURE — 74011000250 HC RX REV CODE- 250: Performed by: INTERNAL MEDICINE

## 2022-11-19 PROCEDURE — 87040 BLOOD CULTURE FOR BACTERIA: CPT

## 2022-11-19 PROCEDURE — 74011000250 HC RX REV CODE- 250: Performed by: STUDENT IN AN ORGANIZED HEALTH CARE EDUCATION/TRAINING PROGRAM

## 2022-11-19 PROCEDURE — 96368 THER/DIAG CONCURRENT INF: CPT

## 2022-11-19 RX ORDER — LEVOFLOXACIN 5 MG/ML
750 INJECTION, SOLUTION INTRAVENOUS
Status: COMPLETED | OUTPATIENT
Start: 2022-11-19 | End: 2022-11-19

## 2022-11-19 RX ORDER — PROCHLORPERAZINE EDISYLATE 5 MG/ML
10 INJECTION INTRAMUSCULAR; INTRAVENOUS
Status: DISCONTINUED | OUTPATIENT
Start: 2022-11-19 | End: 2022-11-20 | Stop reason: HOSPADM

## 2022-11-19 RX ORDER — ONDANSETRON 2 MG/ML
4 INJECTION INTRAMUSCULAR; INTRAVENOUS
Status: COMPLETED | OUTPATIENT
Start: 2022-11-19 | End: 2022-11-19

## 2022-11-19 RX ORDER — LIDOCAINE HYDROCHLORIDE 20 MG/ML
15 SOLUTION OROPHARYNGEAL AS NEEDED
Status: DISCONTINUED | OUTPATIENT
Start: 2022-11-19 | End: 2022-11-20 | Stop reason: HOSPADM

## 2022-11-19 RX ORDER — SODIUM CHLORIDE 0.9 % (FLUSH) 0.9 %
5-10 SYRINGE (ML) INJECTION AS NEEDED
Status: DISCONTINUED | OUTPATIENT
Start: 2022-11-19 | End: 2022-11-20 | Stop reason: HOSPADM

## 2022-11-19 RX ORDER — SODIUM CHLORIDE 9 MG/ML
125 INJECTION, SOLUTION INTRAVENOUS CONTINUOUS
Status: DISCONTINUED | OUTPATIENT
Start: 2022-11-19 | End: 2022-11-20 | Stop reason: HOSPADM

## 2022-11-19 RX ORDER — LIDOCAINE HYDROCHLORIDE 20 MG/ML
JELLY TOPICAL
Status: DISCONTINUED | OUTPATIENT
Start: 2022-11-19 | End: 2022-11-19

## 2022-11-19 RX ORDER — ONDANSETRON 2 MG/ML
4 INJECTION INTRAMUSCULAR; INTRAVENOUS
Status: DISCONTINUED | OUTPATIENT
Start: 2022-11-19 | End: 2022-11-20 | Stop reason: HOSPADM

## 2022-11-19 RX ORDER — METRONIDAZOLE 500 MG/100ML
500 INJECTION, SOLUTION INTRAVENOUS
Status: COMPLETED | OUTPATIENT
Start: 2022-11-19 | End: 2022-11-19

## 2022-11-19 RX ORDER — PYRIDOXINE HCL (VITAMIN B6) 100 MG
100 TABLET ORAL EVERY 8 HOURS
COMMUNITY

## 2022-11-19 RX ADMIN — ONDANSETRON 4 MG: 2 INJECTION INTRAMUSCULAR; INTRAVENOUS at 09:57

## 2022-11-19 RX ADMIN — LEVOFLOXACIN 750 MG: 5 INJECTION, SOLUTION INTRAVENOUS at 13:26

## 2022-11-19 RX ADMIN — SODIUM CHLORIDE 299 ML: 9 INJECTION, SOLUTION INTRAVENOUS at 12:08

## 2022-11-19 RX ADMIN — LIDOCAINE HYDROCHLORIDE 15 ML: 20 SOLUTION ORAL; TOPICAL at 13:38

## 2022-11-19 RX ADMIN — SODIUM CHLORIDE 1000 ML: 9 INJECTION, SOLUTION INTRAVENOUS at 11:36

## 2022-11-19 RX ADMIN — SODIUM CHLORIDE 1000 ML: 9 INJECTION, SOLUTION INTRAVENOUS at 10:34

## 2022-11-19 RX ADMIN — PROCHLORPERAZINE EDISYLATE 10 MG: 5 INJECTION INTRAMUSCULAR; INTRAVENOUS at 17:55

## 2022-11-19 RX ADMIN — SODIUM CHLORIDE 75 ML/HR: 9 INJECTION, SOLUTION INTRAVENOUS at 15:43

## 2022-11-19 RX ADMIN — METRONIDAZOLE 500 MG: 500 INJECTION, SOLUTION INTRAVENOUS at 12:09

## 2022-11-19 RX ADMIN — ONDANSETRON 4 MG: 2 INJECTION INTRAMUSCULAR; INTRAVENOUS at 17:56

## 2022-11-19 RX ADMIN — SODIUM CHLORIDE 1000 ML: 9 INJECTION, SOLUTION INTRAVENOUS at 09:51

## 2022-11-19 RX ADMIN — FAMOTIDINE 20 MG: 10 INJECTION, SOLUTION INTRAVENOUS at 17:36

## 2022-11-19 RX ADMIN — ONDANSETRON 4 MG: 2 INJECTION INTRAMUSCULAR; INTRAVENOUS at 12:43

## 2022-11-19 NOTE — CONSULTS
Gynecology Consult    Name: Dominick Conroy MRN: [id number] SSN: [id number]    YOB: 2000  Age: 25 y.o. Sex: female       Subjective:      Chief complaint:  nausea and vomiting    Shena Cotton is a 25 y.o.   female with no significant PMHx who is admitted for hyperemesis gravidarum. The patient reports that she has been having persistent nausea and vomiting for the last 1.5 weeks with multiple ER visits during that time. She states that she has been unable to keep anything down over the last few days. Most recently she was seen by her OB yesterday for the same. She was prescribed diclegis, but she was unable to obtain this at the pharmacy. She notes little relief with zofran use. On arrival to the ED, The patient was persistently tachycardic. Labs were notable for a lactic acid of 2.1 (now improved to 1.4), Hgb of 16.2, Hct of 43.6, WBC of 15.5, and ketones on UA. She was given zofran 4mg x2, x3 1L NS bolus. She was started on metronidazole and flagyl. She reports multiple episodes of dry-heaving today. She has not been able to tolerate any PO since arrival. She denies abdominal pain, fever, chills, headache, chest pain, or other symptoms at this time. History reviewed. No pertinent past medical history. History reviewed. No pertinent surgical history. OB History          2    Para        Term                AB        Living             SAB        IAB        Ectopic        Molar        Multiple        Live Births                  Allergies   Allergen Reactions    Cephalexin Rash    Motrin [Ibuprofen] Rash     Orange dye from liquid motrin only. Can tolerate tablets without incident.       Current Facility-Administered Medications   Medication Dose Route Frequency Provider Last Rate Last Admin    sodium chloride (NS) flush 5-10 mL  5-10 mL IntraVENous PRN Royer Dockery MD        lidocaine (XYLOCAINE) 2 % viscous solution 15 mL  15 mL Mouth/Throat PRN Marylou Dooley Mell Arzate MD   15 mL at 11/19/22 1338    0.9% sodium chloride infusion  125 mL/hr IntraVENous CONTINUOUS Dc Allen  mL/hr at 11/19/22 1552 125 mL/hr at 11/19/22 1552    ondansetron (ZOFRAN) injection 4 mg  4 mg IntraVENous Q6H PRN Dc Allen MD        famotidine (PF) (PEPCID) 20 mg in 0.9% sodium chloride 10 mL injection  20 mg IntraVENous Q12H Dc Allen MD           Family History   Problem Relation Age of Onset    No Known Problems Mother     No Known Problems Father      Social History     Socioeconomic History    Marital status: SINGLE     Spouse name: Not on file    Number of children: Not on file    Years of education: Not on file    Highest education level: Not on file   Occupational History    Not on file   Tobacco Use    Smoking status: Never    Smokeless tobacco: Never   Substance and Sexual Activity    Alcohol use: No    Drug use: Yes     Types: Marijuana     Comment: in the past    Sexual activity: Never   Other Topics Concern    Not on file   Social History Narrative    Not on file     Social Determinants of Health     Financial Resource Strain: Not on file   Food Insecurity: Not on file   Transportation Needs: Not on file   Physical Activity: Not on file   Stress: Not on file   Social Connections: Not on file   Intimate Partner Violence: Not on file   Housing Stability: Not on file       Review of Systems:  Constitutional: negative for fevers and chills  Eyes: negative for visual disturbance  Ears, Nose, Mouth, Throat, and Face: negative for sore throat  Respiratory: negative for cough or dyspnea on exertion  Cardiovascular: positive for none, negative for chest pain, palpitations  Gastrointestinal: positive for nausea and vomiting, negative for abdominal pain  Genitourinary:negative for frequency and dysuria  Integument/Breast: negative for rash  Neurological: negative for headaches and dizziness     Objective:     Vitals:    11/19/22 1326 11/19/22 1341 11/19/22 1533 22 1536   BP: 109/60 103/62 114/69    Pulse: (!) 105 (!) 103  (!) 103   Resp: 13 14 17    Temp:   98.5 °F (36.9 °C)    SpO2: 100% 100% 100%    Weight:       Height:           General:  alert, cooperative, no distress, thin appearing   Skin:  no rash or abnormalities   Eyes: negative   Mouth: Dry mucous membranes   Lymph Nodes:  Cervical, supraclavicular, and axillary nodes normal.   Lungs:  clear to auscultation bilaterally   Heart:  S1, S2 normal, normal rhythm, tachycardic rate   Abdomen: soft, non-tender. Bowel sounds normal. No masses,  no organomegaly   CVA:  absent   Genitourinary: exam deferred   Extremities:  extremities normal, atraumatic, no cyanosis or edema   Neurologic:  negative   Psychiatric:  non focal     Assessment:     24 y/o  at [address] admitted for hyperemesis gravidarum with evidence of hypovolemia. Lactic acidosis resolved s/p fluid repletion. Tachycardia persisting. Thin appearing with dry mucous membranes on exam.    Plan:     Hyperemesis gravidarum:  - will place order for compazine 10mg q6h prn  - continue zofran 4mg prn as needed  - recommend NPO overnight for bowel rest, advance diet as tolerated  - Consider trial of Reglan if no relief with the above  - recommend continued IVF at rate of 175-200ml/hr. Strict I/O with goal UOP of 100ml/hr. - replete electrolytes as needed  - monitor CBC, BMP with AM labs        Jadon Peña MD - Family Medicine intern  2022    Patient seen and discussed with Dr. Renae Valero.

## 2022-11-19 NOTE — ED PROVIDER NOTES
Patient is a 71-year-old , 8 weeks 6 days by LMP of . This is the patient's fourth ER visit for hyperemesis patient had an emergency OB appointment yesterday was seen they had prescribed diclegis patient has not been able to get as is a special order will not be available till Monday. Patient states that a conservative estimate as she has had 20 episodes of nausea and vomiting since yesterday. Patient denies any chest pain, shortness of breath, abdominal pain, dysuria fevers, chills. History reviewed. No pertinent past medical history. History reviewed. No pertinent surgical history. Family History:   Problem Relation Age of Onset    No Known Problems Mother     No Known Problems Father        Social History     Socioeconomic History    Marital status: SINGLE     Spouse name: Not on file    Number of children: Not on file    Years of education: Not on file    Highest education level: Not on file   Occupational History    Not on file   Tobacco Use    Smoking status: Never    Smokeless tobacco: Never   Substance and Sexual Activity    Alcohol use: No    Drug use: Yes     Types: Marijuana     Comment: in the past    Sexual activity: Never   Other Topics Concern    Not on file   Social History Narrative    Not on file     Social Determinants of Health     Financial Resource Strain: Not on file   Food Insecurity: Not on file   Transportation Needs: Not on file   Physical Activity: Not on file   Stress: Not on file   Social Connections: Not on file   Intimate Partner Violence: Not on file   Housing Stability: Not on file         ALLERGIES: Cephalexin and Motrin [ibuprofen]    Review of Systems   Gastrointestinal:  Positive for nausea and vomiting. All other systems reviewed and are negative.     Vitals:    22 0940 22 0941   BP:  138/86   Pulse:  (!) 110   Resp:  18   Temp:  98.4 °F (36.9 °C)   SpO2:  98%   Weight: 43.3 kg (95 lb 7.4 oz)    Height: 4' 11\" (1.499 m) Physical Exam  Vitals and nursing note reviewed. Constitutional:       Appearance: She is underweight. She is ill-appearing. HENT:      Head: Normocephalic and atraumatic. Mouth/Throat:      Mouth: Mucous membranes are dry. Cardiovascular:      Rate and Rhythm: Regular rhythm. Tachycardia present. Pulses: Normal pulses. Heart sounds: Normal heart sounds. Pulmonary:      Effort: Pulmonary effort is normal.      Breath sounds: Normal breath sounds. Abdominal:      General: Abdomen is flat. Palpations: Abdomen is soft. Musculoskeletal:         General: Normal range of motion. Cervical back: Normal range of motion and neck supple. Skin:     General: Skin is warm and dry. Neurological:      General: No focal deficit present. Mental Status: She is oriented to person, place, and time. Psychiatric:         Mood and Affect: Mood normal.         Behavior: Behavior normal.        MDM  Number of Diagnoses or Management Options  Hyperemesis gravidarum  Sepsis without acute organ dysfunction, due to unspecified organism [facility])  Diagnosis management comments: Hyperemesis gravidarum. 23-year-old female present emergency department with hyperemesis secondary to pregnancy. Will obtain labs, fluids, antiemetics, UA. ED Course as of 11/19/22 1446   Sat Nov 19, 2022   1232 EKG shows sinus tachycardia with a rate of 115 no ST or T wave abnormalities to suggest ischemia or infarct. Normal intervals, normal axis. [DA]      ED Course User Index  [DA] Katharina Habermann, MD       Procedures        10:32 AM  Patient will require admission for intractable nausea vomiting, hyperemesis gravidarum patient now with evidence of the metabolic acidosis likely secondary to intractable nausea and vomiting. We will continue fluid hydration.      [facility] Consult for Admission  11:57 AM    ED Room Number: WER02/02  Patient Name and age:  Cale Martinez 25 y.o.  female  Working Diagnosis: 1. Hyperemesis gravidarum    2. Sepsis without acute organ dysfunction, due to unspecified organism ([address]        COVID-19 Suspicion:  no  Sepsis present:  yes  Reassessment needed: yes  Code Status:  Full Code  Readmission: no  Isolation Requirements:  no  Recommended Level of Care:  telemetry  Department:[place] [facility] - [phone]  Other:       Total critical care time (not including time spent performing separately reportable procedures): 55 min    Sepsis Re-Assessment Documentation:       Vital Signs  Level of Consciousness: Alert (0)  Temp: 98.4 °F (36.9 °C)  Temp Source: Oral  Pulse (Heart Rate): (!) 103  Cardiac Rhythm: Sinus Tachy  Resp Rate: 14  BP: 103/62  MAP (Monitor): 71  MAP (Calculated): 76  BP Patient Position: At rest  MEWS Score: 2

## 2022-11-19 NOTE — H&P
[facility] Erik [address]anner [place], [address]  [phone]    [facility] Medicine History and Physical      NAME:       Amaury Zhang   :       2000   MRN:      [id number]     PCP:      None     Date of service:   2022     Chief  Complaint:  Nausea and vomiting     History Of Presenting Illness:       Ms. Sivakumar Whitley is a 25 y.o. female who is being admitted for Hyperemesis gravidarum with metabolic disturbance. Ms. Sivakumar Whitley presented to our Emergency Department today complaining of  a persistent nausea and vomiting. She is 8+ weeks now and has been having symptoms for more than a week now. Unable to keep anything down. She has mild epigastric discomfort. No diarrhea. She has no dysuria or frequency. She says she was recently diagnosed with COVID-19 but denies any cough, SOB or fever. She was seen by her OB yesterday and started on Diclegis but had persistent symptoms today. She will be admitted for further management. Allergies   Allergen Reactions    Cephalexin Rash    Motrin [Ibuprofen] Rash     Orange dye from liquid motrin only. Can tolerate tablets without incident. Prior to Admission medications    Medication Sig Start Date End Date Taking? Authorizing Provider   pyridoxine, vitamin B6, (Vitamin B-6) 100 mg tablet Take 100 mg by mouth every eight (8) hours. Yes Penny Du MD   metoclopramide HCl (REGLAN) 5 mg tablet Take 1 Tablet by mouth every six (6) hours as needed for Nausea for up to 10 days. 22 Yes Kimberly Gaston MD   fluconazole (DIFLUCAN) 150 mg tablet Take 1 Tab by mouth once as needed for up to 1 dose. Patient not taking: Reported on 2022   Jocy Leach MD     Past medical history: neg for DM, HTN. History reviewed. No pertinent surgical history.     Social History     Tobacco Use Smoking status: Never    Smokeless tobacco: Never   Substance Use Topics    Alcohol use: No        Family History   Problem Relation Age of Onset    No Known Problems Mother     No Known Problems Father      Neg for heart disease    Review of Systems:    Constitutional ROS: no fever, chills, rigors or night sweats  Respiratory ROS: no cough, sputum, hemoptysis, dyspnea or pleuritic pain. Cardiovascular ROS: no chest pain, palpitations, orthopnea, PND or syncope  Endocrine ROS: no polydispsia, polyuria, heat or cold intolerance or major weight change. Gastrointestinal ROS: mild dysphagia, abdominal pain, nausea, vomiting but no diarrhea    Genito-Urinary ROS: no dysuria, frequency, hematuria, retention or flank pain  Musculoskeletal ROS: no joint pain, swelling or muscular tenderness  Neurological ROS: no headache, confusion, focal weakness or any other neurological symptoms  Psychiatric ROS: no depression, anxiety, mood swings  Dermatological ROS: no rash, pruritis, or urticaria  Heme-Lymph ROS: no swollen glands, bleeding    Examination:    Constitutional:  Visit Vitals  /69 (BP 1 Location: Left upper arm, BP Patient Position: At rest)   Pulse (!) 103   Temp 98.5 °F (36.9 °C)   Resp 17   Ht 4' 11\" (1.499 m)   Wt 43.3 kg (95 lb 7.4 oz)   LMP 09/18/2022 Comment: pregnant   SpO2 100%   BMI 19.28 kg/m²         General:  Weak and ill looking patient although not in acute distress   Eyes: Pink conjunctivae, PERRLA with no discharge. Normal eye movements  Ear, Nose, Mouth & Throat: No ottorrhea, rhinorrhea, non tender sinuses, very dry mucous membranes  Respiratory:  No accessory muscle use, clear breath sounds without crackles or wheezes  Cardiovascular:  No JVD or murmurs, sinus tachycardia, without thrills, bruits or peripheral edema.  Capillary refil+, good distal pulses  GI & :  Soft abdomen, mild generalized discomfort, non-distended, normoactive bowel sounds with no palpable organomegaly  Heme:  No cervical or axillary adenopathy. Musculoskeletal:  No cyanosis, clubbing, atrophy or deformities  Skin:  No rashes, bruising or ulcers   Neurological: Awake and alert, speech is clear, CNs 2-12 are grossly intact and otherwise non focal  Psychiatric:  Has a good insight and is oriented x 3  ________________________________________________________________________    Data Review:    Labs:    Recent Labs     11/19/22  0946   WBC 15.5*   HGB 16.2*   HCT 43.6        Recent Labs     11/19/22  0946      K 3.7      CO2 15*   *   BUN 6   CREA 0.70   CA 9.6   ALB 5.5*   ALT 38     No components found for: GLPOC  No results for input(s): PH, PCO2, PO2, HCO3, FIO2 in the last 72 hours. No results for input(s): INR, INREXT, INREXT in the last 72 hours. Imaging Studies:  none    I have also reviewed available old medical records. Assessment & Impression:     Ms. Reji Ackerman is a 25 y.o. female being evaluated for:     Principal Problem:    Hyperemesis gravidarum with metabolic disturbance (41/12/8264)    Active Problems:    Nausea and vomiting (11/19/2022)      COVID-19 virus infection (44/17/5480)      Metabolic acidosis, normal anion gap (NAG) (11/19/2022)      SIRS (systemic inflammatory response syndrome) ([address] (11/19/2022)      8 weeks gestation of pregnancy (11/19/2022)       Plan of management:    Hyperemesis gravidarum with metabolic disturbance (62/29/7180) / Nausea and vomiting (32/07/1446) / Metabolic acidosis, normal anion gap (NAG) (11/19/2022) POA: she has severe symptoms. Unable to keep anything taken orally. Admit to hospital. Start IV fluids, IV Zofran. Consult OB. ADAT. SIRS (systemic inflammatory response syndrome) ([address] (11/19/2022) POA: she has 2/4 SIRS criteria but no focus of infection. I suspect this is from volume depletion due to her vomiting and perhaps COVID 19 viral infection. Would hold further antibiotics for now and follow clinical progress.  Repeat lactic acid is normal.    COVID-19 virus infection (11/19/2022) POA: she has no respiratory symptoms. Not hypoxic.  Supportive care for now and monitor clinical progress    8 weeks gestation of pregnancy (11/19/2022) POA: consult OB for further management in the setting of her hyperemesis    Code Status:  Full    Surrogate decision maker: Family    Risk of deterioration: high      Total time spent for the care of the patient: [address] discussed with: Patient, Family, Nursing Staff and ED physician    Discussed:  Code Status, Care Plan and D/C Planning    Prophylaxis:  SCD's    Probable Disposition:  Home w/Family           ___________________________________________________    Attending Physician: Tod Rachel MD

## 2022-11-19 NOTE — ED TRIAGE NOTES
Pt is 8 weeks 9/13 and seen by gyn yesterday and given a script for diclegis and pt states that she hasn't been able to get it as its special order; reports 15 episodes of emesis in 24 hours; pt reports that she has been to the ER in the past week and received fluids each time; denied vaginal bleeding and DC. Pt c/o burning in throat burning 5/10. Pt took reglan and vitamin B6 at 0530; mucous membranes dry.  Last episode of emesis 0900

## 2022-11-19 NOTE — PROGRESS NOTES
[facility] Pharm[facility] Dosing Services: 11/19/22  Consult for pharmacy to check/evaluate meds for pregnancy (8 weeks)  Physician: Dr Sommer Day    The pharmacist evaluated profile, all meds currently ordered are appropriate    Thank you  Abhijit Gonzales, PharmD  [phone]

## 2022-11-19 NOTE — PROGRESS NOTES
TRANSFER - IN REPORT:    Verbal report received from 5[address] Yony Pat RN(name) on [employer]  being received from [facility] ED(unit) for routine progression of care      Report consisted of patients Situation, Background, Assessment and   Recommendations(SBAR). Information from the following report(s) SBAR, Kardex, ED Summary, Intake/Output, MAR, and Recent Results was reviewed with the receiving nurse. Opportunity for questions and clarification was provided. Assessment completed upon patients arrival to unit and care assumed. 1825 Pt received compazine and zofran combination to reduce side effects. Pt c/o of restlessness and requesting  a shower and stating it may be a side effect from compazine. FP made aware no new orders ordered at this time and to monitor pt side effects throughout shift. Pt aslo educated on showers on the tele unit, given an option for CHG pt declined. Bedside shift change report given to [facility] (oncoming nurse) by Khadijah Cope RN (offgoing nurse). Report included the following information SBAR, Kardex, ED Summary, Intake/Output, MAR, and Recent Results.

## 2022-11-20 PROBLEM — F19.90 SUBSTANCE USE: Status: ACTIVE | Noted: 2022-11-20

## 2022-11-20 NOTE — PROGRESS NOTES
Pt is requesting to go AMA. Dad at bedside to transport. Charge nurse, Nsg Supervisor and DR. Del Valle notified. See AMA form.

## 2022-11-20 NOTE — PROGRESS NOTES
Pt was asked per Dr. Merline Guzman request for verbalization of AMA understanding, refusing further inpatient treatment of hyperemesis, and denies any questions for Dr. Terence Morataya. AMA form signed with myself and dad present. IV dc'd per pt request, and she left AMA ambulatory with dad and johanna Tran, with all belongings.

## 2022-11-20 NOTE — DISCHARGE SUMMARY
[facility] [place] [facility]  [address] [facility], [address] [place] [address]  Tel: [phone]    Hospital Medicine Discharge Summary    Patient ID:    Puja Chatman  Age:              25 y.o.    : 2000  MRN:             [id number]     PCP: None     Date of Admission: 2022    Date of self Discharge:  2022    Principal admission Diagnosis:   Vomiting [R11.10]    Discharge Diagnoses:  Principal Problem:    Hyperemesis gravidarum with metabolic disturbance ()    COVID-19 virus infection ()    Metabolic acidosis, normal anion gap (NAG) (2022)    SIRS (systemic inflammatory response syndrome) ([facility] (2022)    8 weeks gestation of pregnancy (2022)    Substance use (2022)    Hospital Course:     Ms. Nicolas Sandra is a 25 y.o. admitted to [facility] and treated for the following:    Hyperemesis gravidarum with metabolic disturbance () / Nausea and vomiting () / Metabolic acidosis, normal anion gap (NAG) (2022) POA: she was admitted with severe symptoms. Unable to keep anything taken orally. Started on IV fluid hydration, anti-emetic treatments. A toxicology screen done was positive for opiates and THC. Later in the night, she expressed her wish to leave the hospital against medical advice whish she did. Refer to documentation. SIRS (systemic inflammatory response syndrome) ([facility] (2022) POA: she has 2/4 SIRS criteria but no focus of infection. I suspect this is from volume depletion due to her vomiting and perhaps COVID 19 viral infection. Repeat lactic acid is normal with IV fluids. As of the time of this summary, her blood cultures have been neg     COVID-19 virus infection (2022) POA: she has no respiratory symptoms. Not hypoxic. She had been treated with supportive care. 8 weeks gestation of pregnancy (2022) POA: seen by OB while here prior to leaving [place]. Discharge Vitals  /66   Pulse (!) 105   Temp 98 °F (36.7 °C)   Resp 16   Ht 4' 11\" (1.499 m)   Wt 43.3 kg (95 lb 7.4 oz)   LMP 09/18/2022 Comment: pregnant   SpO2 100%   Breastfeeding No   BMI 19.28 kg/m²      Disposition: She left AMA    Signed:  Fred Dougherty MD       11/20/2022   10:22 AM

## 2022-11-20 NOTE — PROGRESS NOTES
Problem: Airway Clearance - Ineffective  Goal: Achieve or maintain patent airway  Outcome: Resolved/Met     Problem: Gas Exchange - Impaired  Goal: Absence of hypoxia  Outcome: Resolved/Met  Goal: Promote optimal lung function  Outcome: Resolved/Met     Problem: Breathing Pattern - Ineffective  Goal: Ability to achieve and maintain a regular respiratory rate  Outcome: Resolved/Met     Problem:  Body Temperature -  Risk of, Imbalanced  Goal: Ability to maintain a body temperature within defined limits  Outcome: Resolved/Met  Goal: Will regain or maintain usual level of consciousness  Outcome: Resolved/Met  Goal: Complications related to the disease process, condition or treatment will be avoided or minimized  Outcome: Resolved/Met     Problem: Isolation Precautions - Risk of Spread of Infection  Goal: Prevent transmission of infectious organism to others  Outcome: Resolved/Met     Problem: Nutrition Deficits  Goal: Optimize nutrtional status  Outcome: Resolved/Met     Problem: Isolation Precautions - Risk of Spread of Infection  Goal: Prevent transmission of infectious organism to others  Outcome: Resolved/Met     Problem: Nutrition Deficits  Goal: Optimize nutrtional status  Outcome: Resolved/Met     Problem: Risk for Fluid Volume Deficit  Goal: Maintain normal heart rhythm  Outcome: Resolved/Met  Goal: Maintain absence of muscle cramping  Outcome: Resolved/Met  Goal: Maintain normal serum potassium, sodium, calcium, phosphorus, and pH  Outcome: Resolved/Met     Problem: Loneliness or Risk for Loneliness  Goal: Demonstrate positive use of time alone when socialization is not possible  Outcome: Resolved/Met     Problem: Fatigue  Goal: Verbalize increase energy and improved vitality  Outcome: Resolved/Met     Problem: Patient Education: Go to Patient Education Activity  Goal: Patient/Family Education  Outcome: Resolved/Met     Problem: Risk for Spread of Infection  Goal: Prevent transmission of infectious organism to others  Description: Prevent the transmission of infectious organisms to other patients, staff members, and visitors.   Outcome: Resolved/Met     Problem: Patient Education:  Go to Education Activity  Goal: Patient/Family Education  Outcome: Resolved/Met     Problem: Hyperemesis  Goal: *Absence of nausea/vomiting  Outcome: Resolved/Met     Problem: Patient Education: Go to Patient Education Activity  Goal: Patient/Family Education  Outcome: Resolved/Met

## 2022-11-20 NOTE — PROGRESS NOTES
Assessment complete. See interventions. Family at bedside. Pt states she feels restless, thinks it is from IV antiemetics given early. There are no other physical s/s of a medication reaction. VSS.

## 2022-11-21 LAB
ATRIAL RATE: 115 BPM
CALCULATED P AXIS, ECG09: 81 DEGREES
CALCULATED R AXIS, ECG10: 84 DEGREES
CALCULATED T AXIS, ECG11: 58 DEGREES
DIAGNOSIS, 93000: NORMAL
P-R INTERVAL, ECG05: 138 MS
Q-T INTERVAL, ECG07: 354 MS
QRS DURATION, ECG06: 78 MS
QTC CALCULATION (BEZET), ECG08: 489 MS
VENTRICULAR RATE, ECG03: 115 BPM

## 2022-11-25 LAB
BACTERIA SPEC CULT: NORMAL
BACTERIA SPEC CULT: NORMAL
SERVICE CMNT-IMP: NORMAL
SERVICE CMNT-IMP: NORMAL